# Patient Record
Sex: MALE | Race: WHITE
[De-identification: names, ages, dates, MRNs, and addresses within clinical notes are randomized per-mention and may not be internally consistent; named-entity substitution may affect disease eponyms.]

---

## 2022-10-12 ENCOUNTER — HOSPITAL ENCOUNTER (EMERGENCY)
Dept: HOSPITAL 26 - MED | Age: 32
Discharge: HOME | End: 2022-10-12
Payer: COMMERCIAL

## 2022-10-12 VITALS — SYSTOLIC BLOOD PRESSURE: 191 MMHG | DIASTOLIC BLOOD PRESSURE: 96 MMHG

## 2022-10-12 VITALS — WEIGHT: 281 LBS | HEIGHT: 73 IN | BODY MASS INDEX: 37.24 KG/M2

## 2022-10-12 DIAGNOSIS — F12.90: ICD-10-CM

## 2022-10-12 DIAGNOSIS — Z79.899: ICD-10-CM

## 2022-10-12 DIAGNOSIS — Z98.890: ICD-10-CM

## 2022-10-12 DIAGNOSIS — Z72.89: ICD-10-CM

## 2022-10-12 DIAGNOSIS — I10: ICD-10-CM

## 2022-10-12 DIAGNOSIS — N39.0: Primary | ICD-10-CM

## 2022-10-12 LAB
APPEARANCE UR: (no result)
BILIRUB UR QL STRIP: (no result)
COLOR UR: (no result)
GLUCOSE UR STRIP-MCNC: NEGATIVE MG/DL
HGB UR QL STRIP: (no result)
LEUKOCYTE ESTERASE UR QL STRIP: (no result)
NITRITE UR QL STRIP: POSITIVE
PH UR STRIP: 5 [PH] (ref 5–9)
RBC #/AREA URNS HPF: (no result) /HPF (ref 0–5)
WBC,URINE: (no result) /HPF (ref 0–5)

## 2022-10-12 NOTE — NUR
32/M PRESENTS TO ED WITH C/O DYSURIA AND HEMATURIA THAT STARTED THIS MORNING. 
PATIENT DENIES FLANK PAIN, FEVERS, CHILLS, ABD PAIN, N/V/D, STATES INITIALLY 
NOTICED HIS URINE WAS "PINK" THIS MORNING, AND SINCE HAS NOTICED MORE BLOOD. 
PATIENT DENIES DISCHARGE, RASH OR SORES, DENIES CONCERNS FOR STDS.

## 2022-10-12 NOTE — NUR
Patient discharged with v/s stable. Written and verbal after care instructions 
ABOUT URINARY TRACT INFECTION given and explained. 

Patient alert, oriented and verbalized understanding of instructions. 
Ambulatory with steady gait. All questions addressed prior to discharge. ID 
band removed. Patient advised to follow up with PMD. Rx of KEFLEX given. 
Patient educated on indication of medication including possible reaction and 
side effects. Opportunity to ask questions provided and answered.